# Patient Record
Sex: FEMALE | Race: WHITE | URBAN - METROPOLITAN AREA
[De-identification: names, ages, dates, MRNs, and addresses within clinical notes are randomized per-mention and may not be internally consistent; named-entity substitution may affect disease eponyms.]

---

## 2017-06-04 ENCOUNTER — HOSPITAL ENCOUNTER (EMERGENCY)
Facility: CLINIC | Age: 22
Discharge: HOME | End: 2017-06-05
Attending: EMERGENCY MEDICINE | Admitting: EMERGENCY MEDICINE
Payer: COMMERCIAL

## 2017-06-04 DIAGNOSIS — S66.822A EXTENSOR TENDON LACERATION OF HAND WITH OPEN WOUND, LEFT, INITIAL ENCOUNTER: ICD-10-CM

## 2017-06-04 DIAGNOSIS — S61.402A EXTENSOR TENDON LACERATION OF HAND WITH OPEN WOUND, LEFT, INITIAL ENCOUNTER: ICD-10-CM

## 2017-06-04 DIAGNOSIS — S61.219A FINGER LACERATION, INITIAL ENCOUNTER: ICD-10-CM

## 2017-06-04 PROCEDURE — 12001 RPR S/N/AX/GEN/TRNK 2.5CM/<: CPT

## 2017-06-04 PROCEDURE — 99283 EMERGENCY DEPT VISIT LOW MDM: CPT

## 2017-06-04 NOTE — ED AVS SNAPSHOT
[facility]    [address]    [facility] [address]    Phone:  [phone]    Fax:  [phone]                                       Deedee Ennis   MRN: [id number]    Department:  [facility]   Date of Visit:  6/4/2017           Patient Information     Date Of Birth          1995        Your diagnoses for this visit were:     Extensor tendon laceration of hand with open wound, left, initial encounter     Finger laceration, initial encounter        You were seen by Aquiles Guevara MD.        Discharge Instructions         Please see a Hand Surgeon (Saúl Herrera or Slim) at [facility] ([phone])  in the next 7 days for a recheck.  Partial tendon laceration, 50%, still in approximation, undyed vicryl suture placed.     Return to the Emergency Room if you develop red streaks up your hand/finger, worsening pain, swelling of the repaired finger, or if you have any new concerns about your health.        It was my pleasure to take care of you today. Thanks for visiting [facility]   Emergency Room.     Aquiles Guevara MD    Tendon Laceration  A tendon is a thick cord that joins muscle to bone and causes the joints to bend and straighten. One of your tendons has been cut. A tendon cut may be partial or complete. A complete cut of the tendon and a severe partial cut will need stitches (sutures) in the tendon. Smaller cuts in the tendon do not require stitches; however, the cut in the skin will need to be closed with stitches or staples.  A cut tendon takes about 6 weeks to regain its full strength. Forceful use of the tendon too soon could cause the weakened tendon to tear apart.  Antibiotics may be prescribed to reduce the risk of infection in the tendon.  Some tendons are located close to the nerves, therefore, it is possible to bruise or cut a nerve when you injure a tendon. This may cause numbness or  weakness of the hand or foot. Because of local pain and swelling at the time of injury it can be difficult to fully assess nerve function. If you notice numbness or weakness that persists, notify your doctor. A nerve repair can be done 5-10 days after injury.  Home care    Keep the injured part elevated during the first 48 hours to reduce swelling and pain.    If a splint was applied, leave it in place until your next exam (unless told otherwise). Keep the part dry when bathing by covering it in a plastic bag sealed with a rubber band at the top end.    If no splint was applied, change the bandage after 24 hours and begin cleaning the wound once a day with soap and water.    After removing the bandage, wash the area with soap and water. Use a wet cotton swab (Q tip) to loosen and remove any blood or crust that forms. After cleaning, apply a thin layer of over-the-counter antibiotic ointment. This will keep the wound clean and make it easier to remove the stitches or staples. Reapply a fresh bandage.    Remove the bandage to shower as usual after the first 24 hours, but do not soak the area in water (no swimming) until the stitches or staples are removed.    If antibiotics were prescribed, take them as directed until they are gone or you are told to stop.    Take medicines for pain as directed by the healthcare provider.    If you are not current on your vaccination and the object that caused the cut may lead to tetanus, you may be given a tetanus shot.  Follow-up care  Stitches placed in the tendon will not need to be removed. Stitches or staples placed in the skin will be removed in 7-10 days. Be sure to keep your appointment for removal. At your follow up visit, talk to your doctor about when to begin exercising the tendon in order to prevent stiffness.    When to seek medical advice  Call your healthcare provider right away if any of these occur:    Wound bleeding not controlled by direct pressure    Signs of  infection, including increasing pain in the wound, increasing wound redness or swelling, or pus or bad odor coming from the wound    Fever of 100.4 F (38 C) or higher or as directed by your healthcare provider    Stitches or staples come apart or fall out    Wound edges re-open    Wound changes colors    Numbness around the wound     Decreased movement around the injured area    Persistent numbness or weakness in the injured extremity    2384-4698 [employer]. [address] [place] [address], [place] [address]. All rights reserved. This information is not intended as a substitute for professional medical care. Always follow your healthcare professional's instructions.          24 Hour Appointment Hotline       To make an appointment at any [facility], call [phone] ([phone]). If you don't have a family doctor or clinic, we will help you find one. [facility] are conveniently located to serve the needs of you and your family.             Review of your medicines      START taking        Dose / Directions Last dose taken    bacitracin ointment   Quantity:  10 g        Apply topically 2 times daily   Refills:  1        cephALEXin 500 MG capsule   Commonly known as:  KEFLEX   Dose:  500 mg   Quantity:  28 capsule        Take 1 capsule (500 mg) by mouth 4 times daily for 7 days   Refills:  0                Prescriptions were sent or printed at these locations (2 Prescriptions)                   Other Prescriptions                Printed at Department/Unit printer (2 of 2)         cephALEXin (KEFLEX) 500 MG capsule               bacitracin ointment                Orders Needing Specimen Collection     None      Pending Results     No orders found for last 3 day(s).            Pending Culture Results     No orders found for last 3 day(s).            Pending Results Instructions     If you had any lab results that were not finalized at the time of your Discharge, you can call the ED Lab Result RN  at [phone]. You will be contacted by this team for any positive Lab results or changes in treatment. The nurses are available 7 days a week from 10A to 6:30P.  You can leave a message 24 hours per day and they will return your call.        Test Results From Your Hospital Stay               Clinical Quality Measure: Blood Pressure Screening     Your blood pressure was checked while you were in the emergency department today. The last reading we obtained was  BP: 111/78 . Please read the guidelines below about what these numbers mean and what you should do about them.  If your systolic blood pressure (the top number) is less than 120 and your diastolic blood pressure (the bottom number) is less than 80, then your blood pressure is normal. There is nothing more that you need to do about it.  If your systolic blood pressure (the top number) is 120-139 or your diastolic blood pressure (the bottom number) is 80-89, your blood pressure may be higher than it should be. You should have your blood pressure rechecked within a year by a primary care provider.  If your systolic blood pressure (the top number) is 140 or greater or your diastolic blood pressure (the bottom number) is 90 or greater, you may have high blood pressure. High blood pressure is treatable, but if left untreated over time it can put you at risk for heart attack, stroke, or kidney failure. You should have your blood pressure rechecked by a primary care provider within the next 4 weeks.  If your provider in the emergency department today gave you specific instructions to follow-up with your doctor or provider even sooner than that, you should follow that instruction and not wait for up to 4 weeks for your follow-up visit.        Thank you for choosing [facility]       Thank you for choosing [facility] for your care. Our goal is always to provide you with excellent care. Hearing back from our patients is one way we can continue to improve our services.  "Please take a few minutes to complete the written survey that you may receive in the mail after you visit with us. Thank you!        [employer] Information     [employer] lets you send messages to your doctor, view your test results, renew your prescriptions, schedule appointments and more. To sign up, go to [email or web address] . Click on \"Log in\" on the left side of the screen, which will take you to the Welcome page. Then click on \"Sign up Now\" on the right side of the page.     You will be asked to enter the access code listed below, as well as some personal information. Please follow the directions to create your username and password.     Your access code is: [email or web address]  Expires: 9/3/2017 12:51 AM     Your access code will  in 90 days. If you need help or a new code, please call your [facility] clinic or [phone].        Care EveryWhere ID     This is your Care EveryWhere ID. This could be used by other organizations to access your [facility] medical records  [id number]        After Visit Summary       This is your record. Keep this with you and show to your community pharmacist(s) and doctor(s) at your next visit.                  "

## 2017-06-04 NOTE — ED AVS SNAPSHOT
[facility]    [address]    [facility] [address]    Phone:  [phone]    Fax:  [phone]                                       Deedee Ennis   MRN: [id number]    Department:  [facility]   Date of Visit:  6/4/2017           After Visit Summary Signature Page     I have received my discharge instructions, and my questions have been answered. I have discussed any challenges I see with this plan with the nurse or doctor.    ..........................................................................................................................................  Patient/Patient Representative Signature      ..........................................................................................................................................  Patient Representative Print Name and Relationship to Patient    ..................................................               ................................................  Date                                            Time    ..........................................................................................................................................  Reviewed by Signature/Title    ...................................................              ..............................................  Date                                                            Time

## 2017-06-05 VITALS
DIASTOLIC BLOOD PRESSURE: 68 MMHG | SYSTOLIC BLOOD PRESSURE: 121 MMHG | OXYGEN SATURATION: 99 % | RESPIRATION RATE: 16 BRPM | HEART RATE: 84 BPM | TEMPERATURE: 98.5 F

## 2017-06-05 RX ORDER — GINSENG 100 MG
CAPSULE ORAL
Status: DISCONTINUED
Start: 2017-06-05 | End: 2017-06-05 | Stop reason: HOSPADM

## 2017-06-05 RX ORDER — LIDOCAINE HYDROCHLORIDE AND EPINEPHRINE 10; 10 MG/ML; UG/ML
INJECTION, SOLUTION INFILTRATION; PERINEURAL
Status: DISCONTINUED
Start: 2017-06-05 | End: 2017-06-05 | Stop reason: HOSPADM

## 2017-06-05 RX ORDER — CEPHALEXIN 500 MG/1
500 CAPSULE ORAL 4 TIMES DAILY
Qty: 28 CAPSULE | Refills: 0 | Status: SHIPPED | OUTPATIENT
Start: 2017-06-05 | End: 2017-06-12

## 2017-06-05 RX ORDER — BACITRACIN ZINC 500 [USP'U]/G
OINTMENT TOPICAL 2 TIMES DAILY
Qty: 10 G | Refills: 1 | Status: SHIPPED | OUTPATIENT
Start: 2017-06-05

## 2017-06-05 ASSESSMENT — ENCOUNTER SYMPTOMS: WOUND: 1

## 2017-06-05 NOTE — DISCHARGE INSTRUCTIONS
Please see a Hand Surgeon (Saúl Herrera or Slim) at [facility] ([phone])  in the next 7 days for a recheck.  Partial tendon laceration, 50%, still in approximation, undyed vicryl suture placed.     Return to the Emergency Room if you develop red streaks up your hand/finger, worsening pain, swelling of the repaired finger, or if you have any new concerns about your health.        It was my pleasure to take care of you today. Thanks for visiting [facility]   Emergency Room.     Aquiles Guevara MD    Tendon Laceration  A tendon is a thick cord that joins muscle to bone and causes the joints to bend and straighten. One of your tendons has been cut. A tendon cut may be partial or complete. A complete cut of the tendon and a severe partial cut will need stitches (sutures) in the tendon. Smaller cuts in the tendon do not require stitches; however, the cut in the skin will need to be closed with stitches or staples.  A cut tendon takes about 6 weeks to regain its full strength. Forceful use of the tendon too soon could cause the weakened tendon to tear apart.  Antibiotics may be prescribed to reduce the risk of infection in the tendon.  Some tendons are located close to the nerves, therefore, it is possible to bruise or cut a nerve when you injure a tendon. This may cause numbness or weakness of the hand or foot. Because of local pain and swelling at the time of injury it can be difficult to fully assess nerve function. If you notice numbness or weakness that persists, notify your doctor. A nerve repair can be done 5-10 days after injury.  Home care    Keep the injured part elevated during the first 48 hours to reduce swelling and pain.    If a splint was applied, leave it in place until your next exam (unless told otherwise). Keep the part dry when bathing by covering it in a plastic bag sealed with a rubber band at the top end.    If no splint was applied, change the bandage after 24 hours and  begin cleaning the wound once a day with soap and water.    After removing the bandage, wash the area with soap and water. Use a wet cotton swab (Q tip) to loosen and remove any blood or crust that forms. After cleaning, apply a thin layer of over-the-counter antibiotic ointment. This will keep the wound clean and make it easier to remove the stitches or staples. Reapply a fresh bandage.    Remove the bandage to shower as usual after the first 24 hours, but do not soak the area in water (no swimming) until the stitches or staples are removed.    If antibiotics were prescribed, take them as directed until they are gone or you are told to stop.    Take medicines for pain as directed by the healthcare provider.    If you are not current on your vaccination and the object that caused the cut may lead to tetanus, you may be given a tetanus shot.  Follow-up care  Stitches placed in the tendon will not need to be removed. Stitches or staples placed in the skin will be removed in 7-10 days. Be sure to keep your appointment for removal. At your follow up visit, talk to your doctor about when to begin exercising the tendon in order to prevent stiffness.    When to seek medical advice  Call your healthcare provider right away if any of these occur:    Wound bleeding not controlled by direct pressure    Signs of infection, including increasing pain in the wound, increasing wound redness or swelling, or pus or bad odor coming from the wound    Fever of 100.4 F (38 C) or higher or as directed by your healthcare provider    Stitches or staples come apart or fall out    Wound edges re-open    Wound changes colors    Numbness around the wound     Decreased movement around the injured area    Persistent numbness or weakness in the injured extremity    4342-5162 [employer] [address] [place] [address], [place] [address]. All rights reserved. This information is not intended as a substitute for professional medical care.  Always follow your healthcare professional's instructions.

## 2017-06-05 NOTE — ED NOTES
Small lac to top of left index finger from washing knife.    Pt A&O x 3, CMS x 3, ABCD's adequate in triage

## 2017-06-05 NOTE — ED PROVIDER NOTES
History     Chief Complaint:  Laceration    HPI   Deedee Ennis is a 21 year old female who presents with laceration to the top of her left index finger. She cut it earlier today from washing a knife. The patient expresses concern as she is soon going to [place] for 5 days. She is up to date on Tetanus immunization.     Allergies:  Septra     Medications:    The patient is currently on no regular medications.    Past Medical History:    The patient denies any significant past medical history.    Past Surgical History:    The patient does not have any pertinent past surgical history.    Family History:    No past pertinent family history.    Social History:  Negative for tobacco use.  Positive for alcohol use.   Marital Status:  Single [1]    Review of Systems   Skin: Positive for wound.   All other systems reviewed and are negative.      Physical Exam   First Vitals:  BP: 111/78  Pulse: 84  Temp: 98.5  F (36.9  C)  Resp: 16  SpO2: 100 %      Physical Exam  General: Patient is alert and interactive when I enter the room  Head:  The scalp, face, and head appear normal  Eyes:  The pupils are equal, round, and reactive to light    Conjunctivae and sclerae are normal  ENT:    External acoustic canals are normal    The oropharynx is normal without erythema.     Uvula is in the midline  Neck:  Normal range of motion  CV:  Regular rate. S1/S2. No murmurs.   Resp:  Lungs are clear without wheezes or rales. No distress  GI:  Abdomen is soft, no rigidity, guarding, or rebound    No distension. No tenderness to palpation in any quadrant.     MS:  Normal tone. Joints grossly normal without effusions.     No asymmetric leg swelling, calf or thigh tenderness.      Normal motor assessment of all extremities.  50 % extensor tendon still intact  Skin:  No rash or lesions noted. Normal capillary refill noted.     1 cm to extensor surface of left index finger  Neuro: Speech is normal and fluent. Face is symmetric.     Moving all  extremities well.   Psych:  Awake. Alert.  Normal affect.  Appropriate interactions.  Lymph: No anterior cervical lymphadenopathy noted      Emergency Department Course     Procedures:     Laceration Repair      LACERATION:  A simple clean 1 cm laceration.  Underlying extensor tendon laceration.     LOCATION:  Left index finger.    FUNCTION:  Distally sensation, circulation, motor and function are intact.    ANESTHESIA:  Local using Lido with Epi total of 2 cc.    PREPARATION:  Irrigation and Scrubbing with Normal Saline.    DEBRIDEMENT:  no debridement.    CLOSURE:  Wound was closed with One Layer.  Skin closed with 2 horizontal mattress sutures  x 5.0 Prolene using interrupted sutures.  Extensor tendon laceration was equivocal to suture, it was across the entire extensor tendon but not full thickness, approximately 50% probably of tendon to slightly less involved.  I did pass  One undyed vicryl to support tendon as it heals, splinted in extension, to follow up with hand surgery.       Emergency Department Course:  Nursing notes and vitals reviewed. I performed an exam of the patient as documented above.     The above workup was undertaken.    0004 I numbed up the patient for laceration repair.     0027  I preformed the laceration repair as noted above.    Findings and plan explained to the Patient. Patient discharged home with instructions regarding supportive care, medications, and reasons to return. The importance of close follow-up was reviewed. The patient was prescribed keflex, bacitracin      Impression & Plan    Medical Decision Making:  Deedee Ennis is a 21 year old female who presents for evaluation of a laceration to the left index finger.  The wound was carefully evaluated and explored.  The laceration was closed as noted above. Tendon was repaired with one vicryl, undyed.  There is no evidence of muscular, or bony damage with this laceration.  No signs of foreign body.  Possible complications  (infection, scarring) were reviewed with the patient.  Follow up with hand as noted in the discharge section.    Diagnosis:    ICD-10-CM    1. Extensor tendon laceration of hand with open wound, left, initial encounter S66.822A     S61.402A    2. Finger laceration, initial encounter S61.219A        Disposition:  discharged to home    Discharge Medications:  New Prescriptions    BACITRACIN OINTMENT    Apply topically 2 times daily    CEPHALEXIN (KEFLEX) 500 MG CAPSULE    Take 1 capsule (500 mg) by mouth 4 times daily for 7 days     Marga BEATTY am serving as a scribe on 6/4/2017 at 11:55 PM to personally document services performed by Aquiles Guevara MD based on my observations and the provider's statements to me.     Marga Fierro  6/4/2017   [facility] EMERGENCY DEPARTMENT       Aquiles Guevara MD  06/05/17 0058